# Patient Record
Sex: MALE | Race: BLACK OR AFRICAN AMERICAN | NOT HISPANIC OR LATINO | Employment: FULL TIME | URBAN - METROPOLITAN AREA
[De-identification: names, ages, dates, MRNs, and addresses within clinical notes are randomized per-mention and may not be internally consistent; named-entity substitution may affect disease eponyms.]

---

## 2023-10-16 ENCOUNTER — APPOINTMENT (OUTPATIENT)
Dept: URGENT CARE | Age: 63
End: 2023-10-16

## 2024-09-12 ENCOUNTER — OFFICE VISIT (OUTPATIENT)
Dept: URGENT CARE | Age: 64
End: 2024-09-12
Payer: COMMERCIAL

## 2024-09-12 VITALS
RESPIRATION RATE: 18 BRPM | WEIGHT: 185 LBS | BODY MASS INDEX: 26.48 KG/M2 | DIASTOLIC BLOOD PRESSURE: 80 MMHG | HEART RATE: 96 BPM | HEIGHT: 70 IN | TEMPERATURE: 98.7 F | SYSTOLIC BLOOD PRESSURE: 148 MMHG | OXYGEN SATURATION: 98 %

## 2024-09-12 DIAGNOSIS — L03.317 CELLULITIS AND ABSCESS OF BUTTOCK: Primary | ICD-10-CM

## 2024-09-12 DIAGNOSIS — L02.214 ABSCESS OF LEFT GROIN: ICD-10-CM

## 2024-09-12 DIAGNOSIS — L02.31 CELLULITIS AND ABSCESS OF BUTTOCK: Primary | ICD-10-CM

## 2024-09-12 PROCEDURE — 87186 SC STD MICRODIL/AGAR DIL: CPT | Performed by: STUDENT IN AN ORGANIZED HEALTH CARE EDUCATION/TRAINING PROGRAM

## 2024-09-12 PROCEDURE — G0383 LEV 4 HOSP TYPE B ED VISIT: HCPCS | Performed by: STUDENT IN AN ORGANIZED HEALTH CARE EDUCATION/TRAINING PROGRAM

## 2024-09-12 PROCEDURE — 87205 SMEAR GRAM STAIN: CPT | Performed by: STUDENT IN AN ORGANIZED HEALTH CARE EDUCATION/TRAINING PROGRAM

## 2024-09-12 PROCEDURE — 87147 CULTURE TYPE IMMUNOLOGIC: CPT | Performed by: STUDENT IN AN ORGANIZED HEALTH CARE EDUCATION/TRAINING PROGRAM

## 2024-09-12 PROCEDURE — 87070 CULTURE OTHR SPECIMN AEROBIC: CPT | Performed by: STUDENT IN AN ORGANIZED HEALTH CARE EDUCATION/TRAINING PROGRAM

## 2024-09-12 RX ORDER — DOXYCYCLINE 100 MG/1
100 CAPSULE ORAL 2 TIMES DAILY
Qty: 20 CAPSULE | Refills: 0 | Status: SHIPPED | OUTPATIENT
Start: 2024-09-12 | End: 2024-09-22

## 2024-09-12 NOTE — PROGRESS NOTES
[facility]        NAME: Jayce Hobbs is a 64 y.o. male  : 1960    MRN: [id number]  DATE: 2024  TIME: 6:09 PM    Assessment and Plan   Cellulitis and abscess of buttock [L02.31, L03.317]  1. Cellulitis and abscess of buttock  doxycycline monohydrate (MONODOX) 100 mg capsule      2. Abscess of left groin  doxycycline monohydrate (MONODOX) 100 mg capsule            Patient Instructions   Today we drained 2 abscesses on your right buttock and left groin area.  We will send out wound cultures to see what kind of bacteria is present.  We will call you if we need to adjust the antibiotic.  It is important to establish care with a primary care doctor, I recommend this both for a recheck and also for routine health care.  Please keep the area clean and dry, if your dressing becomes dirty, wet, saturated, please change to a new one.  I recommend using a clean warm compress with a warm washcloth to the areas at least twice per day to help with any further drainage.  If you have any worsening despite the antibiotic such as spreading redness, swelling, increasing pain, fevers or chills, please go to the ER as you may require IV antibiotics.    To establish care with a primary care doctor,  Please call central scheduling for an appointment:  [phone] or toll free [phone]     Follow up with PCP in 3-5 days.  Proceed to  ER if symptoms worsen.    If tests have been performed at [facility], our office will contact you with results if changes need to be made to the care plan discussed with you at the visit.  [facility]    Chief Complaint     Chief Complaint   Patient presents with    Abscess     Pt states he has a large hard lump on right buttocks. Does not know if area was pinched or insect bite.          History of Present Illness       Patient presents for evaluation of symptoms that started 2 days ago.  He started noticing swelling and pain  "to his right buttock.  He does that he was sitting on a broken connector area in the car, sitting out for a while, unsure if this might of pinched him or future started noticing the area due to sitting on a hard surface.  Additionally, notes that his belt him the lower abdominal area and has started to notice an abscess forming in this area too.  Otherwise in his usual state of health, no fevers, no chills.        Review of Systems   Review of Systems   All other systems reviewed and are negative.        Current Medications       Current Outpatient Medications:     doxycycline monohydrate (MONODOX) 100 mg capsule, Take 1 capsule (100 mg total) by mouth 2 (two) times a day for 10 days, Disp: 20 capsule, Rfl: 0    Current Allergies     Allergies as of 09/12/2024    (No Known Allergies)            The following portions of the patient's history were reviewed and updated as appropriate: allergies, current medications, past family history, past medical history, past social history, past surgical history and problem list.     No past medical history on file.    No past surgical history on file.    No family history on file.      Medications have been verified.        Objective   /80   Pulse 96   Temp 98.7 °F (37.1 °C)   Resp 18   Ht 5' 10\" (1.778 m)   Wt 83.9 kg (185 lb)   SpO2 98%   BMI 26.54 kg/m²   No LMP for male patient.       Physical Exam     Physical Exam  Vitals and nursing note reviewed.   Constitutional:       Appearance: He is not ill-appearing, toxic-appearing or diaphoretic.   HENT:      Head: Normocephalic and atraumatic.      Right Ear: External ear normal.      Left Ear: External ear normal.      Nose: Nose normal.   Eyes:      Extraocular Movements: Extraocular movements intact.      Conjunctiva/sclera: Conjunctivae normal.   Skin:     General: Skin is warm and dry.      Findings: Lesion present.      Comments: 5cm area of erythema to the posterior right gluteal area, indurated, " tender    Left anterior pelvic area along his belt line with 2 cm area of swelling, tenderness, central pustule   Neurological:      Mental Status: He is alert.   Psychiatric:         Mood and Affect: Mood normal.           Incision and drain    Date/Time: 9/12/2024 4:00 PM    Performed by: Josie Graham DO  Authorized by: Josie Graham DO  [place] Protocol:  procedure performed by consultantConsent: Verbal consent obtained.  Risks and benefits: risks, benefits and alternatives were discussed  Consent given by: patient  Patient understanding: patient states understanding of the procedure being performed  Patient identity confirmed: verbally with patient    Patient location:  Bedside  Location:     Type:  Abscess    Size:  2    Location: L upper groin area.  Pre-procedure details:     Skin preparation:  Betadine  Anesthesia (see MAR for exact dosages):     Anesthesia method:  Local infiltration    Local anesthetic:  Lidocaine 1% WITH epi  Procedure details:     Complexity:  Simple    Needle aspiration: no      Incision types:  Stab incision    Scalpel blade:  11    Approach:  Puncture    Incision depth:  Subcutaneous    Wound management:  Probed and deloculated    Drainage:  Bloody and purulent    Drainage amount:  Moderate    Wound treatment:  Wound left open    Packing materials:  None  Post-procedure details:     Patient tolerance of procedure:  Tolerated well, no immediate complications    2nd Incision and drain:  Anesthesia with 1% lido with epi,  11 blade R buttock, 1cm incision, moderate purulent and bloody, Abscess about 2 cm with surrounding erythema and induration, Probed and deloculated, irrigated with saline. No packing.

## 2024-09-12 NOTE — PATIENT INSTRUCTIONS
Today we drained 2 abscesses on your right buttock and left groin area.  We will send out wound cultures to see what kind of bacteria is present.  We will call you if we need to adjust the antibiotic.  It is important to establish care with a primary care doctor, I recommend this both for a recheck and also for routine health care.  Please keep the area clean and dry, if your dressing becomes dirty, wet, saturated, please change to a new one.  I recommend using a clean warm compress with a warm washcloth to the areas at least twice per day to help with any further drainage.  If you have any worsening despite the antibiotic such as spreading redness, swelling, increasing pain, fevers or chills, please go to the ER as you may require IV antibiotics.    To establish care with a primary care doctor,  Please call central scheduling for an appointment:  [phone] or toll free [phone]

## 2024-09-14 LAB
BACTERIA WND AEROBE CULT: ABNORMAL
BACTERIA WND AEROBE CULT: ABNORMAL
GRAM STN SPEC: ABNORMAL

## 2025-01-03 ENCOUNTER — APPOINTMENT (OUTPATIENT)
Dept: URGENT CARE | Age: 65
End: 2025-01-03